# Patient Record
Sex: FEMALE | URBAN - METROPOLITAN AREA
[De-identification: names, ages, dates, MRNs, and addresses within clinical notes are randomized per-mention and may not be internally consistent; named-entity substitution may affect disease eponyms.]

---

## 2018-01-17 ENCOUNTER — EMERGENCY (EMERGENCY)
Facility: HOSPITAL | Age: 63
LOS: 0 days | Discharge: HOME | End: 2018-01-17
Attending: EMERGENCY MEDICINE | Admitting: EMERGENCY MEDICINE
Payer: COMMERCIAL

## 2018-01-17 VITALS
DIASTOLIC BLOOD PRESSURE: 63 MMHG | TEMPERATURE: 99 F | OXYGEN SATURATION: 100 % | RESPIRATION RATE: 18 BRPM | WEIGHT: 117.95 LBS | SYSTOLIC BLOOD PRESSURE: 162 MMHG | HEIGHT: 64 IN | HEART RATE: 77 BPM

## 2018-01-17 DIAGNOSIS — M79.602 PAIN IN LEFT ARM: ICD-10-CM
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
## 2018-01-17 DIAGNOSIS — S50.02XA CONTUSION OF LEFT ELBOW, INITIAL ENCOUNTER: ICD-10-CM

## 2018-01-17 DIAGNOSIS — S20.212A CONTUSION OF LEFT FRONT WALL OF THORAX, INITIAL ENCOUNTER: ICD-10-CM

## 2018-01-17 DIAGNOSIS — S63.502A UNSPECIFIED SPRAIN OF LEFT WRIST, INITIAL ENCOUNTER: ICD-10-CM
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
## 2018-01-17 DIAGNOSIS — T14.8XXA OTHER INJURY OF UNSPECIFIED BODY REGION, INITIAL ENCOUNTER: ICD-10-CM

## 2018-01-17 PROCEDURE — 71100 X-RAY EXAM RIBS UNI 2 VIEWS: CPT | Mod: 26,LT

## 2018-01-17 PROCEDURE — 99285 EMERGENCY DEPT VISIT HI MDM: CPT

## 2018-01-17 PROCEDURE — 73090 X-RAY EXAM OF FOREARM: CPT | Mod: 26,LT

## 2018-01-17 PROCEDURE — 71046 X-RAY EXAM CHEST 2 VIEWS: CPT | Mod: 26

## 2018-01-17 PROCEDURE — 73110 X-RAY EXAM OF WRIST: CPT | Mod: 26,LT

## 2018-01-17 PROCEDURE — 73080 X-RAY EXAM OF ELBOW: CPT | Mod: 26,LT

## 2018-01-17 RX ORDER — TETANUS TOXOID, REDUCED DIPHTHERIA TOXOID AND ACELLULAR PERTUSSIS VACCINE, ADSORBED 5; 2.5; 8; 8; 2.5 [IU]/.5ML; [IU]/.5ML; UG/.5ML; UG/.5ML; UG/.5ML
0.5 SUSPENSION INTRAMUSCULAR ONCE
Qty: 0 | Refills: 0 | Status: COMPLETED | OUTPATIENT
Start: 2018-01-17 | End: 2018-01-17

## 2018-01-17 RX ADMIN — TETANUS TOXOID, REDUCED DIPHTHERIA TOXOID AND ACELLULAR PERTUSSIS VACCINE, ADSORBED 0.5 MILLILITER(S): 5; 2.5; 8; 8; 2.5 SUSPENSION INTRAMUSCULAR at 20:22

## 2018-01-17 NOTE — ED STATDOCS - NS_ ATTENDINGSCRIBEDETAILS _ED_A_ED_FT
Gentry Morton DO (Attending): The history, relevant review of systems, past medical and surgical history, medical decision making, and physical examination was documented by the scribe in my presence and I attest to the accuracy of the documentation.

## 2018-01-17 NOTE — ED STATDOCS - PROGRESS NOTE DETAILS
62 yr. old female PMH: Denied presents to ED with abrasions and ecchymosis on left arm and pain in left rib cage after falling on Ice going down stairs this morning. Hi head but no LOC no N/V. Not taking any blood thinners. No hip or pelvis pain.  Ambulatory without difficulty. No daily meds. Seen and examined by attending in intake. Plan: X-Ray left elbow wrist and left ribs. Will F/U with results and re evaluate. Jovita SAUCEDO Abrasions left arm: cleansed and irrigated with saline bacitracin and DSD applied. Jovita NP

## 2018-01-17 NOTE — ED STATDOCS - CARE PLAN
Principal Discharge DX:	Abrasions of multiple sites  Secondary Diagnosis:	Elbow contusion  Secondary Diagnosis:	Wrist sprain

## 2018-01-17 NOTE — ED STATDOCS - MUSCULOSKELETAL, MLM
+Left forearm with 4cm x 2cm hematoma overlying volar ulnar aspect with deep abrasion, deep abrasion also just proximal to elbow. +Bony tenderness over mid ulna. Strength 5/5, sensation and pulses intact. +Mild midaxillary tenderness ribs 7, 8, 9, no overlying ecchymosis, skin normal. No pain with deep inspiration. range of motion is not limited and there is no muscle tenderness.

## 2018-01-17 NOTE — ED STATDOCS - ATTENDING CONTRIBUTION TO CARE
I, Gentry Morton DO,  performed the initial face to face bedside interview with this patient regarding history of present illness, review of symptoms and relevant past medical, social and family history.  I completed an independent physical examination.  I was the initial provider who evaluated this patient. I have signed out the follow up of any pending tests (i.e. labs, radiological studies) to the ACP.  I have communicated the patient’s plan of care and disposition with the ACP.

## 2018-01-17 NOTE — ED STATDOCS - OBJECTIVE STATEMENT
63 y/o female with no significant PMHx presents to the ED c/o left arm abrasions, bruising, and pain s/p fall this morning on ice. Pt states she hit her head but denies any injury or pain to the head. Pain rated 1/10. +Left outer rib tenderness. +Mild nausea shortly after fall, quickly resolved. Denies difficulty ambulating, denies pelvic/hip pain. Pt is right-handed. Last tetanus unknown. No daily meds. No PMD.

## 2020-11-16 ENCOUNTER — TRANSCRIPTION ENCOUNTER (OUTPATIENT)
Age: 65
End: 2020-11-16

## 2021-08-12 ENCOUNTER — APPOINTMENT (OUTPATIENT)
Dept: OBGYN | Facility: CLINIC | Age: 66
End: 2021-08-12

## 2023-01-11 ENCOUNTER — OFFICE (OUTPATIENT)
Dept: URBAN - METROPOLITAN AREA CLINIC 102 | Facility: CLINIC | Age: 68
Setting detail: OPHTHALMOLOGY
End: 2023-01-11
Payer: COMMERCIAL

## 2023-01-11 DIAGNOSIS — H16.223: ICD-10-CM

## 2023-01-11 DIAGNOSIS — H02.402: ICD-10-CM

## 2023-01-11 DIAGNOSIS — H25.13: ICD-10-CM

## 2023-01-11 PROCEDURE — 92004 COMPRE OPH EXAM NEW PT 1/>: CPT | Performed by: OPHTHALMOLOGY

## 2023-01-11 ASSESSMENT — DRY EYES - PHYSICIAN NOTES
OS_GENERALCOMMENTS: KERATITIS
OD_GENERALCOMMENTS: KERATITIS

## 2023-01-11 ASSESSMENT — REFRACTION_AUTOREFRACTION
OS_CYLINDER: -0.75
OD_SPHERE: +2.50
OS_AXIS: 164
OS_SPHERE: +1.75
OD_AXIS: 123
OD_CYLINDER: -0.75

## 2023-01-11 ASSESSMENT — CONFRONTATIONAL VISUAL FIELD TEST (CVF)
OS_FINDINGS: FULL
OD_FINDINGS: FULL

## 2023-01-11 ASSESSMENT — SUPERFICIAL PUNCTATE KERATITIS (SPK)
OS_SPK: 1+
OD_SPK: 1+

## 2023-01-11 ASSESSMENT — KERATOMETRY
OD_K2POWER_DIOPTERS: 43.00
OS_K2POWER_DIOPTERS: 44.00
OD_AXISANGLE_DEGREES: 090
OS_K1POWER_DIOPTERS: 43.25
OD_K1POWER_DIOPTERS: 43.00
OS_AXISANGLE_DEGREES: 076

## 2023-01-11 ASSESSMENT — AXIALLENGTH_DERIVED
OD_AL: 22.9648
OS_AL: 23.0243

## 2023-01-11 ASSESSMENT — SPHEQUIV_DERIVED
OS_SPHEQUIV: 1.375
OD_SPHEQUIV: 2.125

## 2023-01-11 ASSESSMENT — VISUAL ACUITY
OD_BCVA: 20/25-
OS_BCVA: 20/30+2

## 2023-01-11 ASSESSMENT — LID POSITION - PTOSIS: OS_PTOSIS: 1+

## 2023-01-11 ASSESSMENT — TONOMETRY
OD_IOP_MMHG: 10
OS_IOP_MMHG: 12

## 2023-01-16 ENCOUNTER — OFFICE (OUTPATIENT)
Dept: URBAN - METROPOLITAN AREA CLINIC 102 | Facility: CLINIC | Age: 68
Setting detail: OPHTHALMOLOGY
End: 2023-01-16
Payer: COMMERCIAL

## 2023-01-16 DIAGNOSIS — H02.402: ICD-10-CM

## 2023-01-16 DIAGNOSIS — H25.13: ICD-10-CM

## 2023-01-16 DIAGNOSIS — H16.223: ICD-10-CM

## 2023-01-16 PROCEDURE — 92012 INTRM OPH EXAM EST PATIENT: CPT | Performed by: OPHTHALMOLOGY

## 2023-01-16 ASSESSMENT — KERATOMETRY
OS_AXISANGLE_DEGREES: 065
OD_AXISANGLE_DEGREES: 076
OS_K2POWER_DIOPTERS: 44.25
OD_K1POWER_DIOPTERS: 43.00
OD_K2POWER_DIOPTERS: 43.50
OS_K1POWER_DIOPTERS: 43.25

## 2023-01-16 ASSESSMENT — REFRACTION_AUTOREFRACTION
OD_CYLINDER: -0.50
OS_AXIS: 147
OS_CYLINDER: -0.75
OD_AXIS: 128
OS_SPHERE: +1.75
OD_SPHERE: +2.25

## 2023-01-16 ASSESSMENT — SPHEQUIV_DERIVED
OS_SPHEQUIV: 1.375
OD_SPHEQUIV: 2

## 2023-01-16 ASSESSMENT — VISUAL ACUITY
OD_BCVA: 20/25-
OS_BCVA: 20/30

## 2023-01-16 ASSESSMENT — TONOMETRY
OS_IOP_MMHG: 15
OD_IOP_MMHG: 11

## 2023-01-16 ASSESSMENT — AXIALLENGTH_DERIVED
OS_AL: 22.9806
OD_AL: 22.924

## 2023-01-16 ASSESSMENT — SUPERFICIAL PUNCTATE KERATITIS (SPK)
OS_SPK: ABSENT
OD_SPK: ABSENT

## 2023-01-16 ASSESSMENT — CONFRONTATIONAL VISUAL FIELD TEST (CVF)
OD_FINDINGS: FULL
OS_FINDINGS: FULL

## 2023-01-16 ASSESSMENT — DRY EYES - PHYSICIAN NOTES
OS_GENERALCOMMENTS: KERATITIS
OD_GENERALCOMMENTS: KERATITIS

## 2023-01-16 ASSESSMENT — LID POSITION - PTOSIS: OS_PTOSIS: 1+

## 2023-08-06 DIAGNOSIS — Z78.9 OTHER SPECIFIED HEALTH STATUS: ICD-10-CM

## 2023-08-06 DIAGNOSIS — Z83.3 FAMILY HISTORY OF DIABETES MELLITUS: ICD-10-CM

## 2023-08-07 ENCOUNTER — APPOINTMENT (OUTPATIENT)
Dept: FAMILY MEDICINE | Facility: CLINIC | Age: 68
End: 2023-08-07
Payer: COMMERCIAL

## 2023-08-07 VITALS
SYSTOLIC BLOOD PRESSURE: 120 MMHG | BODY MASS INDEX: 20.83 KG/M2 | OXYGEN SATURATION: 98 % | HEART RATE: 62 BPM | HEIGHT: 64 IN | TEMPERATURE: 98.4 F | DIASTOLIC BLOOD PRESSURE: 62 MMHG | WEIGHT: 122 LBS

## 2023-08-07 DIAGNOSIS — Z83.49 FAMILY HISTORY OF OTHER ENDOCRINE, NUTRITIONAL AND METABOLIC DISEASES: ICD-10-CM

## 2023-08-07 DIAGNOSIS — Z83.42 FAMILY HISTORY OF FAMILIAL HYPERCHOLESTEROLEMIA: ICD-10-CM

## 2023-08-07 DIAGNOSIS — Z86.2 PERSONAL HISTORY OF DISEASES OF THE BLOOD AND BLOOD-FORMING ORGANS AND CERTAIN DISORDERS INVOLVING THE IMMUNE MECHANISM: ICD-10-CM

## 2023-08-07 DIAGNOSIS — R00.1 BRADYCARDIA, UNSPECIFIED: ICD-10-CM

## 2023-08-07 DIAGNOSIS — Z91.09 OTHER ALLERGY STATUS, OTHER THAN TO DRUGS AND BIOLOGICAL SUBSTANCES: ICD-10-CM

## 2023-08-07 DIAGNOSIS — Z00.00 ENCOUNTER FOR GENERAL ADULT MEDICAL EXAMINATION W/OUT ABNORMAL FINDINGS: ICD-10-CM

## 2023-08-07 DIAGNOSIS — B00.2 HERPESVIRAL GINGIVOSTOMATITIS AND PHARYNGOTONSILLITIS: ICD-10-CM

## 2023-08-07 DIAGNOSIS — Z12.39 ENCOUNTER FOR OTHER SCREENING FOR MALIGNANT NEOPLASM OF BREAST: ICD-10-CM

## 2023-08-07 PROCEDURE — 99387 INIT PM E/M NEW PAT 65+ YRS: CPT | Mod: 25

## 2023-08-07 PROCEDURE — 93000 ELECTROCARDIOGRAM COMPLETE: CPT | Mod: 59

## 2023-08-07 PROCEDURE — 36415 COLL VENOUS BLD VENIPUNCTURE: CPT

## 2023-08-07 RX ORDER — VALACYCLOVIR 1 G/1
1 TABLET, FILM COATED ORAL
Qty: 10 | Refills: 0 | Status: ACTIVE | COMMUNITY
Start: 2023-07-21

## 2023-08-07 NOTE — ASSESSMENT
[FreeTextEntry1] : BRENDA OLIVO is a 68 year old female here for a physical exam.  She is also here to follow up on medical issues as noted above.

## 2023-08-07 NOTE — PHYSICAL EXAM
[No Acute Distress] : no acute distress [Well Developed] : well developed [Well-Appearing] : well-appearing [Normal Sclera/Conjunctiva] : normal sclera/conjunctiva [EOMI] : extraocular movements intact [Normal Outer Ear/Nose] : the outer ears and nose were normal in appearance [Normal Oropharynx] : the oropharynx was normal [No JVD] : no jugular venous distention [No Lymphadenopathy] : no lymphadenopathy [Supple] : supple [Thyroid Normal, No Nodules] : the thyroid was normal and there were no nodules present [No Respiratory Distress] : no respiratory distress  [No Accessory Muscle Use] : no accessory muscle use [Clear to Auscultation] : lungs were clear to auscultation bilaterally [Normal Rate] : normal rate  [Regular Rhythm] : with a regular rhythm [Normal S1, S2] : normal S1 and S2 [No Murmur] : no murmur heard [No Carotid Bruits] : no carotid bruits [No Varicosities] : no varicosities [Pedal Pulses Present] : the pedal pulses are present [No Edema] : there was no peripheral edema [No Extremity Clubbing/Cyanosis] : no extremity clubbing/cyanosis [Soft] : abdomen soft [Non Tender] : non-tender [Non-distended] : non-distended [No Masses] : no abdominal mass palpated [No HSM] : no HSM [Normal Bowel Sounds] : normal bowel sounds [Normal Posterior Cervical Nodes] : no posterior cervical lymphadenopathy [Normal Anterior Cervical Nodes] : no anterior cervical lymphadenopathy [No Joint Swelling] : no joint swelling [Grossly Normal Strength/Tone] : grossly normal strength/tone [No Rash] : no rash [Coordination Grossly Intact] : coordination grossly intact [No Focal Deficits] : no focal deficits [Normal Gait] : normal gait [Normal Affect] : the affect was normal [Normal Insight/Judgement] : insight and judgment were intact [de-identified] : slender frame

## 2023-08-07 NOTE — PLAN
[FreeTextEntry1] : Continue all medications as prescribed. Check labs as above (fasting except for coffee with a little cream) . Will adjust any medications based upon lab results.  Reviewed age-appropriate preventive screening tests with patient. UTD on CRC screening and she will get a copy of colonoscopy results sent to me for review. Due for gyn exam and mammogram and DEXA and she will schedule for near future. [facility] names given and she believes that Dr. Chung delivered her daughter years ago.   Revd/recommended Tdap booster, PCV 20, Shingrix series. She recalls she had SHingrix series and may have had Tdap and she will try to get dates for me. She plans PCV20 in near future at pharmacy (defers today)   ECG shows sinus bradycardia (she is physically active/in good shape but will check TSH) and RSR1 V1 nl variant.   Discussed clean eating (eg Mediterranean style eating plan) and regular exercise/staying as physically active as possible.  Include balance exercises and strength training and core strengthening exercises for bone health and to decrease risk for falls.  Reviewed diagnosis, etiology, preventative care measures and treatment for oral HSV. Reviewed importance of starting oral anti-viral medication ASAP at onset of first symptoms (eg tingling, burning, itching sensation, ideally prior to onset of vesicle/sore) for best effect. Can also use L lysine po supplement, and/or tea tree oil topically. Supporting healthy immune system (eating cleanly, trying to get enough rest, regular exercise, stress reduction/management as much as possible) and using UV protection on face/lips can also help to reduce frequency/severity of, or prevent outbreaks.  Reviewed importance of good self care (e.g. meditation, yoga, adequate rest, regular exercise, magnesium, clean eating, etc.).  Follow up for next physical in one year.

## 2023-08-07 NOTE — REVIEW OF SYSTEMS
[Negative] : Heme/Lymph [FreeTextEntry4] : h/o oral herpes, recently given Rx fort Valacyclovir but has not yet needed to use

## 2023-08-07 NOTE — HISTORY OF PRESENT ILLNESS
[FreeTextEntry1] : BRENDA OLIVO is a 68 year old female here for a physical exam. [de-identified] : Yodit is here to re-establish primary care (last visit to our office was in 2009) and for a PE visit. Her last physical exam was years ago (?2009)   Vaccines: Tetanus is up to date in past 10 yrs?, she is not sure but may have had after injury at [facility] and she will try to check and let me know Pneumococcal vaccination is NOT up to date Shingrix is up to date, Shingrix x 2 doses, first dose a while back and second dose in 2023-- she will try to get dates for me COVID vaccine is up to date  Her last dentist visit was less than one year ago Her last eye doctor appointment was less than one year ago Her last dermatologist visit was less than one year ago, Dr. Art's NP   GYN visit is NOT up to date, Dr. Chaparro/Dr. Braun several years ago  Mammogram is NOT up to date, several yrs ago Colon cancer screening is up to date, colonoscopy, in GI office [facility] (?name of GI), ?early 2020, wnl per pt, recalls was told rpt by 10 yrs -- she will try to get a copy of results sent to me DEXA is NOT up to date, never had   Her diet is healthy overall, more of a [id number] style eating plan, occas red meat but more chicken fish  Exercise: works with  2x/wk TRX, Pilates, walking/active in daily life   Yodit has h/o envtal allergies (car dander, seasonal) and h/o anemia (years ago).  Her  sees Dr. Carley Luna at [facility] for card care. She has not had any cardiac sxs or need to see card. Has strong FH high chol in mom but mom is 97 yrs of age and has not had signif CV disease

## 2023-08-07 NOTE — HEALTH RISK ASSESSMENT
[No falls in past year] : Patient reported no falls in the past year [0] : 2) Feeling down, depressed, or hopeless: Not at all (0) [PHQ-2 Negative - No further assessment needed] : PHQ-2 Negative - No further assessment needed [Never] : Never [[id number]] : 0

## 2023-08-08 DIAGNOSIS — E78.5 HYPERLIPIDEMIA, UNSPECIFIED: ICD-10-CM

## 2023-08-08 LAB
ALBUMIN SERPL ELPH-MCNC: 4.5 G/DL
ALP BLD-CCNC: 59 U/L
ALT SERPL-CCNC: 14 U/L
ANION GAP SERPL CALC-SCNC: 11 MMOL/L
AST SERPL-CCNC: 19 U/L
BASOPHILS # BLD AUTO: 0.02 K/UL
BASOPHILS NFR BLD AUTO: 0.4 %
BILIRUB SERPL-MCNC: 1.1 MG/DL
BUN SERPL-MCNC: 9 MG/DL
CALCIUM SERPL-MCNC: 9.6 MG/DL
CHLORIDE SERPL-SCNC: 106 MMOL/L
CHOLEST SERPL-MCNC: 261 MG/DL
CO2 SERPL-SCNC: 26 MMOL/L
CREAT SERPL-MCNC: 0.78 MG/DL
EGFR: 82 ML/MIN/1.73M2
EOSINOPHIL # BLD AUTO: 0.09 K/UL
EOSINOPHIL NFR BLD AUTO: 1.7 %
GLUCOSE SERPL-MCNC: 99 MG/DL
HCT VFR BLD CALC: 42.4 %
HDLC SERPL-MCNC: 88 MG/DL
HGB BLD-MCNC: 13.3 G/DL
IMM GRANULOCYTES NFR BLD AUTO: 0.2 %
LDLC SERPL CALC-MCNC: 160 MG/DL
LYMPHOCYTES # BLD AUTO: 1.49 K/UL
LYMPHOCYTES NFR BLD AUTO: 28.2 %
MAN DIFF?: NORMAL
MCHC RBC-ENTMCNC: 30.8 PG
MCHC RBC-ENTMCNC: 31.4 GM/DL
MCV RBC AUTO: 98.1 FL
MONOCYTES # BLD AUTO: 0.35 K/UL
MONOCYTES NFR BLD AUTO: 6.6 %
NEUTROPHILS # BLD AUTO: 3.32 K/UL
NEUTROPHILS NFR BLD AUTO: 62.9 %
NONHDLC SERPL-MCNC: 173 MG/DL
PLATELET # BLD AUTO: 218 K/UL
POTASSIUM SERPL-SCNC: 4.3 MMOL/L
PROT SERPL-MCNC: 6.7 G/DL
RBC # BLD: 4.32 M/UL
RBC # FLD: 13.2 %
SODIUM SERPL-SCNC: 143 MMOL/L
TRIGL SERPL-MCNC: 80 MG/DL
TSH SERPL-ACNC: 1.17 UIU/ML
WBC # FLD AUTO: 5.28 K/UL

## 2024-03-04 ENCOUNTER — APPOINTMENT (OUTPATIENT)
Dept: OBGYN | Facility: CLINIC | Age: 69
End: 2024-03-04
Payer: COMMERCIAL

## 2024-03-04 VITALS
BODY MASS INDEX: 20.32 KG/M2 | SYSTOLIC BLOOD PRESSURE: 136 MMHG | WEIGHT: 119 LBS | DIASTOLIC BLOOD PRESSURE: 74 MMHG | HEIGHT: 64 IN

## 2024-03-04 DIAGNOSIS — N95.2 POSTMENOPAUSAL ATROPHIC VAGINITIS: ICD-10-CM

## 2024-03-04 DIAGNOSIS — Z01.419 ENCOUNTER FOR GYNECOLOGICAL EXAMINATION (GENERAL) (ROUTINE) W/OUT ABNORMAL FINDINGS: ICD-10-CM

## 2024-03-04 LAB
CARD LOT #: NORMAL
CARD LOT EXP DATE: NORMAL
DATE COLLECTED: NORMAL
DATE COLLECTED: NORMAL
DEVELOPER LOT #: NORMAL
DEVELOPER LOT EXP DATE: NORMAL
HEMOCCULT 2: NEGATIVE
HEMOCCULT SP1 STL QL: NEGATIVE
QUALITY CONTROL: YES
QUALITY CONTROL: YES

## 2024-03-04 PROCEDURE — 99387 INIT PM E/M NEW PAT 65+ YRS: CPT

## 2024-03-04 PROCEDURE — 82270 OCCULT BLOOD FECES: CPT

## 2024-03-04 NOTE — PHYSICAL EXAM
[Appropriately responsive] : appropriately responsive [Alert] : alert [No Acute Distress] : no acute distress [No Lymphadenopathy] : no lymphadenopathy [Regular Rate Rhythm] : regular rate rhythm [No Murmurs] : no murmurs [Soft] : soft [Clear to Auscultation B/L] : clear to auscultation bilaterally [Non-tender] : non-tender [Non-distended] : non-distended [No HSM] : No HSM [No Lesions] : no lesions [No Mass] : no mass [Oriented x3] : oriented x3 [Vulvar Atrophy] : vulvar atrophy [Labia Majora] : normal [Labia Minora] : normal [Atrophy] : atrophy [Normal] : normal [Uterine Adnexae] : normal [No Tenderness] : no tenderness [Nl Sphincter Tone] : normal sphincter tone [FreeTextEntry5] : Pap done [FreeTextEntry9] : Hemoccult negative

## 2024-03-04 NOTE — HISTORY OF PRESENT ILLNESS
[FreeTextEntry1] : Patient is a 68-year-old female presents for a routine annual gynecologic examination.  Patients last exam was over 10 years ago.  Patient has never had a bone density DEXA and last mammogram was many years ago.  Patient has no complaints.

## 2024-03-04 NOTE — DISCUSSION/SUMMARY
[FreeTextEntry1] : Impression: Vulvovaginal atrophy, otherwise normal GYN exam  Recommendations: Self breast exam, mammography, bone density DEXA, calcium and vitamin D supplementation regular exercise  Follow-up in 1 year

## 2024-03-06 LAB — CYTOLOGY CVX/VAG DOC THIN PREP: ABNORMAL

## 2024-04-25 ENCOUNTER — APPOINTMENT (OUTPATIENT)
Dept: ORTHOPEDIC SURGERY | Facility: CLINIC | Age: 69
End: 2024-04-25

## 2024-08-12 ENCOUNTER — APPOINTMENT (OUTPATIENT)
Dept: FAMILY MEDICINE | Facility: CLINIC | Age: 69
End: 2024-08-12
Payer: COMMERCIAL

## 2024-08-12 VITALS
HEART RATE: 63 BPM | DIASTOLIC BLOOD PRESSURE: 68 MMHG | WEIGHT: 124 LBS | HEIGHT: 64 IN | BODY MASS INDEX: 21.17 KG/M2 | SYSTOLIC BLOOD PRESSURE: 110 MMHG | OXYGEN SATURATION: 97 % | TEMPERATURE: 97 F

## 2024-08-12 DIAGNOSIS — Z23 ENCOUNTER FOR IMMUNIZATION: ICD-10-CM

## 2024-08-12 DIAGNOSIS — B00.2 HERPESVIRAL GINGIVOSTOMATITIS AND PHARYNGOTONSILLITIS: ICD-10-CM

## 2024-08-12 DIAGNOSIS — R00.1 BRADYCARDIA, UNSPECIFIED: ICD-10-CM

## 2024-08-12 DIAGNOSIS — Z91.09 OTHER ALLERGY STATUS, OTHER THAN TO DRUGS AND BIOLOGICAL SUBSTANCES: ICD-10-CM

## 2024-08-12 DIAGNOSIS — Z00.00 ENCOUNTER FOR GENERAL ADULT MEDICAL EXAMINATION W/OUT ABNORMAL FINDINGS: ICD-10-CM

## 2024-08-12 DIAGNOSIS — E78.5 HYPERLIPIDEMIA, UNSPECIFIED: ICD-10-CM

## 2024-08-12 DIAGNOSIS — Z82.3 FAMILY HISTORY OF STROKE: ICD-10-CM

## 2024-08-12 DIAGNOSIS — Z86.2 PERSONAL HISTORY OF DISEASES OF THE BLOOD AND BLOOD-FORMING ORGANS AND CERTAIN DISORDERS INVOLVING THE IMMUNE MECHANISM: ICD-10-CM

## 2024-08-12 PROCEDURE — 99397 PER PM REEVAL EST PAT 65+ YR: CPT

## 2024-08-12 NOTE — HISTORY OF PRESENT ILLNESS
[FreeTextEntry1] : BRENDA OLIVO is a 69 year old female here for a physical exam. [de-identified] : Her last physical exam was last year  Vaccines: Tetanus is up to date? She may have had dose in hosp within past 10 yrs and will check Pneumococcal vaccination is NOT up to date Shingrix is up to date x 2 doses and she is going to get dates for me Gets annual flu vacc at work   Her last dentist visit was within past 6-12 months Her last eye doctor appointment was less than one year ago Her last dermatologist visit was less than one year ago, Dr. Art's NP eRnae Schaeffer. 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 Mohs surgeon is based in [facility]    GYN visit is up to date, 3/2024, Dr. Chung Mammogram is NOT up to date, last mammo was years ago Colon cancer screening is up to date she states, as had colonoscopy, in GI office [place] (?name of GI), ?early 2020, wnl per pt, recalls was told rpt by 10 yrs -- she will try to get a copy of results sent to me Had hemoccult x 2 negative 3/2024 with gyn.  DEXA is NOT up to date, as has never had DEXA exam.   Her diet is healthy overall, Mediterranean style eating, no processed food.  Exercise: cardio and strength training   Yodit has high cholesterol, environmental allergies (cat dander, seasonal) and h/o anemia (years ago).   At her 2023 PE visit , HDL 88. 10 yr ASCVD 7.4%. She has been working on eating cleanly  and exercising.   Her  sees Dr. Carley Luna at [facility] for card care. She has not had any cardiac sxs or need to see card. 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 She will get results sent over

## 2024-08-12 NOTE — HEALTH RISK ASSESSMENT
[No falls in past year] : Patient reported no falls in the past year [0] : 2) Feeling down, depressed, or hopeless: Not at all (0) [PHQ-2 Negative - No further assessment needed] : PHQ-2 Negative - No further assessment needed [Never] : Never [[id number]] : 0

## 2024-08-12 NOTE — PHYSICAL EXAM
[No Acute Distress] : no acute distress [Well Developed] : well developed [Well-Appearing] : well-appearing [Normal Sclera/Conjunctiva] : normal sclera/conjunctiva [EOMI] : extraocular movements intact [Normal Outer Ear/Nose] : the outer ears and nose were normal in appearance [Normal Oropharynx] : the oropharynx was normal [No JVD] : no jugular venous distention [No Lymphadenopathy] : no lymphadenopathy [Supple] : supple [Thyroid Normal, No Nodules] : the thyroid was normal and there were no nodules present [No Respiratory Distress] : no respiratory distress  [No Accessory Muscle Use] : no accessory muscle use [Clear to Auscultation] : lungs were clear to auscultation bilaterally [Normal Rate] : normal rate  [Regular Rhythm] : with a regular rhythm [Normal S1, S2] : normal S1 and S2 [No Murmur] : no murmur heard [No Carotid Bruits] : no carotid bruits [No Varicosities] : no varicosities [Pedal Pulses Present] : the pedal pulses are present [No Edema] : there was no peripheral edema [No Extremity Clubbing/Cyanosis] : no extremity clubbing/cyanosis [Soft] : abdomen soft [Non Tender] : non-tender [Non-distended] : non-distended [No Masses] : no abdominal mass palpated [No HSM] : no HSM [Normal Bowel Sounds] : normal bowel sounds [Normal Posterior Cervical Nodes] : no posterior cervical lymphadenopathy [Normal Anterior Cervical Nodes] : no anterior cervical lymphadenopathy [No Joint Swelling] : no joint swelling [Grossly Normal Strength/Tone] : grossly normal strength/tone [No Rash] : no rash [Coordination Grossly Intact] : coordination grossly intact [No Focal Deficits] : no focal deficits [Normal Gait] : normal gait [Normal Affect] : the affect was normal [Normal Insight/Judgement] : insight and judgment were intact [de-identified] : slender frame

## 2024-08-12 NOTE — REVIEW OF SYSTEMS
[Negative] : Heme/Lymph [Earache] : no earache [Nasal Discharge] : no nasal discharge [Sore Throat] : no sore throat [Postnasal Drip] : no postnasal drip [FreeTextEntry4] : h/o oral herpes, uses Valacyclovir prn, also seasonal/envtal allergies intermittently

## 2024-08-12 NOTE — PHYSICAL EXAM
[No Acute Distress] : no acute distress [Well Developed] : well developed [Well-Appearing] : well-appearing [Normal Sclera/Conjunctiva] : normal sclera/conjunctiva [EOMI] : extraocular movements intact [Normal Outer Ear/Nose] : the outer ears and nose were normal in appearance [Normal Oropharynx] : the oropharynx was normal [No JVD] : no jugular venous distention [No Lymphadenopathy] : no lymphadenopathy [Supple] : supple [Thyroid Normal, No Nodules] : the thyroid was normal and there were no nodules present [No Respiratory Distress] : no respiratory distress  [No Accessory Muscle Use] : no accessory muscle use [Clear to Auscultation] : lungs were clear to auscultation bilaterally [Normal Rate] : normal rate  [Regular Rhythm] : with a regular rhythm [Normal S1, S2] : normal S1 and S2 [No Murmur] : no murmur heard [No Carotid Bruits] : no carotid bruits [No Varicosities] : no varicosities [Pedal Pulses Present] : the pedal pulses are present [No Edema] : there was no peripheral edema [No Extremity Clubbing/Cyanosis] : no extremity clubbing/cyanosis [Soft] : abdomen soft [Non Tender] : non-tender [Non-distended] : non-distended [No Masses] : no abdominal mass palpated [No HSM] : no HSM [Normal Bowel Sounds] : normal bowel sounds [Normal Posterior Cervical Nodes] : no posterior cervical lymphadenopathy [Normal Anterior Cervical Nodes] : no anterior cervical lymphadenopathy [No Joint Swelling] : no joint swelling [Grossly Normal Strength/Tone] : grossly normal strength/tone [No Rash] : no rash [Coordination Grossly Intact] : coordination grossly intact [No Focal Deficits] : no focal deficits [Normal Gait] : normal gait [Normal Affect] : the affect was normal [Normal Insight/Judgement] : insight and judgment were intact [de-identified] : slender frame

## 2024-08-12 NOTE — PLAN
[FreeTextEntry1] : Continue all medications as prescribed. We deferred on labs today as she had labs in Spring 2024 as part of corporate PE and would like to send those in for review before deciding if addt labs needed.   Reviewed age-appropriate preventive screening tests with patient. UTD on gyn exam. Due for mammogram, and DEXA and will schedule for near future. Revd that there are orders in system from Dr. Chung for mammo and DEXA. She feels she is UTD on colorectal cancer screening and will try to get copy of most recent colonoscopy (or contact info for GI who did colonoscopy) for me to review.   Revd/recommended Tdap booster (if can not find proof of having had within past 10 yrs, and PCV 20 dose. She is certain she had Shingrix series x 2 doses and will gets dates for me to review. She consented to Tdap though we did not get a chance to give to her prior to her leaving office today so will call to let her know she can return anytime  She had ECg as part of her corporate PE Spring 2024 and was wnl and she will send results in. Once we review results we can decide if any addtl labs or testing is needed  LDL goal <=100 ideally. Reviewed LDL goal and ASCVD risk estimate and factors that go into this calculation.  Reviewed risks/benefits of statin med. Reviewed red yeast rice RYR (600-1200 mg daily) risks/benefits as an alternative to statin meds if not ready to consider statin meds. Recommended excellent hydration +/- co Q10 (100-400 mg daily) to decrease risk for statin (or RYR) related myalgias.   Based on most recent LDL and 10 yr ASCVD risk estimate level statin therapy is not yet clearly indicated. Consider use of psyllium husk fiber supplement. Consider nonurgent card eval and testing as that information would likely help her to make a more informed decision re if needs to consider statin med sooner than the numbers show need to.   Discussed clean eating (eg Mediterranean style plant based eating plan) and regular exercise/staying as physically active as possible.  Include balance exercises and strength training and core strengthening exercises for bone health and to decrease risk for falls.  Reviewed diagnosis, etiology, preventative care measures and treatment for oral HSV. Reviewed importance of starting oral anti-viral medication ASAP at onset of first symptoms (eg tingling, burning, itching sensation, ideally prior to onset of vesicle/sore) for best effect. Can also use L lysine po supplement, and/or tea tree oil topically. Supporting healthy immune system (eating cleanly, trying to get enough rest, regular exercise, stress reduction/management as much as possible) and using UV protection on face/lips can also help to reduce frequency/severity of, or prevent outbreaks.  Diagnosis, common environmental causes, treatment options for environmental/seasonal allergies reviewed. Reviewed environmental control measures (keep windows closed, put on AC, shower/rinse off after being outdoors, pillow/mattress covers, HEPA type filters etc),nasal steroid (eg fluticasone= Flonase, Nasonex=mometasone etc) and/or nasal antihistamine (eg Azelastine=Astepro) sprays,  OTC oral antihistamine +/- oral decongestant if tolerated, nasal saline flushes/Neti Pot, +/- can add Mucinex, +/- can add Singulair (Montelukast) to find what combination of meds works best.  Reviewed importance of good self care (e.g. meditation, yoga, adequate rest, regular exercise, magnesium, clean eating, etc.).  Follow up with specialists as recommended by them.   Follow up for next physical in one year. Schedule RPA visit (chol etc) and repeat fasting labs in about 6 months.

## 2024-08-12 NOTE — HEALTH RISK ASSESSMENT
[No falls in past year] : Patient reported no falls in the past year [0] : 2) Feeling down, depressed, or hopeless: Not at all (0) [PHQ-2 Negative - No further assessment needed] : PHQ-2 Negative - No further assessment needed [Never] : Never [[id number]] : 0

## 2024-08-12 NOTE — HISTORY OF PRESENT ILLNESS
[FreeTextEntry1] : BRENDA OLIVO is a 69 year old female here for a physical exam. [de-identified] : Her last physical exam was last year  Vaccines: Tetanus is up to date? She may have had dose in hosp within past 10 yrs and will check Pneumococcal vaccination is NOT up to date Shingrix is up to date x 2 doses and she is going to get dates for me Gets annual flu vacc at work   Her last dentist visit was within past 6-12 months Her last eye doctor appointment was less than one year ago Her last dermatologist visit was less than one year ago, Dr. Art's NP Renae Scaheffer. 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 Mohs surgeon is based in [facility]    GYN visit is up to date, 3/2024, Dr. Chung Mammogram is NOT up to date, last mammo was years ago Colon cancer screening is up to date she states, as had colonoscopy, in GI office [place] (?name of GI), ?early 2020, wnl per pt, recalls was told rpt by 10 yrs -- she will try to get a copy of results sent to me Had hemoccult x 2 negative 3/2024 with gyn.  DEXA is NOT up to date, as has never had DEXA exam.   Her diet is healthy overall, Mediterranean style eating, no processed food.  Exercise: cardio and strength training   Yodit has high cholesterol, environmental allergies (cat dander, seasonal) and h/o anemia (years ago).   At her 2023 PE visit , HDL 88. 10 yr ASCVD 7.4%. She has been working on eating cleanly  and exercising.   Her  sees Dr. Carley Luna at [facility] for card care. She has not had any cardiac sxs or need to see card. 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 She will get results sent over

## 2024-08-12 NOTE — ASSESSMENT
[FreeTextEntry1] : YODIT OLIVO is a 69 year old female here for a physical exam.  She is also here to follow up on medical issues as noted above.  Yodit has high cholesterol, and environmental allergies (cat dander, seasonal)

## 2024-08-13 ENCOUNTER — APPOINTMENT (OUTPATIENT)
Dept: FAMILY MEDICINE | Facility: CLINIC | Age: 69
End: 2024-08-13
Payer: COMMERCIAL

## 2024-08-13 ENCOUNTER — MED ADMIN CHARGE (OUTPATIENT)
Age: 69
End: 2024-08-13

## 2024-08-13 PROCEDURE — 90715 TDAP VACCINE 7 YRS/> IM: CPT

## 2024-08-13 PROCEDURE — 90471 IMMUNIZATION ADMIN: CPT

## 2025-05-14 DIAGNOSIS — Z12.39 ENCOUNTER FOR OTHER SCREENING FOR MALIGNANT NEOPLASM OF BREAST: ICD-10-CM

## 2025-07-01 ENCOUNTER — RESULT REVIEW (OUTPATIENT)
Age: 70
End: 2025-07-01

## 2025-07-01 ENCOUNTER — APPOINTMENT (OUTPATIENT)
Dept: MAMMOGRAPHY | Facility: CLINIC | Age: 70
End: 2025-07-01
Payer: COMMERCIAL

## 2025-07-01 ENCOUNTER — APPOINTMENT (OUTPATIENT)
Dept: RADIOLOGY | Facility: CLINIC | Age: 70
End: 2025-07-01
Payer: COMMERCIAL

## 2025-07-01 ENCOUNTER — OUTPATIENT (OUTPATIENT)
Dept: OUTPATIENT SERVICES | Facility: HOSPITAL | Age: 70
LOS: 1 days | End: 2025-07-01
Payer: COMMERCIAL

## 2025-07-01 DIAGNOSIS — Z12.39 ENCOUNTER FOR OTHER SCREENING FOR MALIGNANT NEOPLASM OF BREAST: ICD-10-CM

## 2025-07-01 PROBLEM — Z13.820 OSTEOPOROSIS SCREENING: Status: ACTIVE | Noted: 2025-07-01

## 2025-07-01 PROCEDURE — 77067 SCR MAMMO BI INCL CAD: CPT | Mod: 26

## 2025-07-01 PROCEDURE — 77085 DXA BONE DENSITY AXL VRT FX: CPT

## 2025-07-01 PROCEDURE — 77063 BREAST TOMOSYNTHESIS BI: CPT

## 2025-07-01 PROCEDURE — 77085 DXA BONE DENSITY AXL VRT FX: CPT | Mod: 26

## 2025-07-01 PROCEDURE — 77063 BREAST TOMOSYNTHESIS BI: CPT | Mod: 26

## 2025-07-01 PROCEDURE — 77067 SCR MAMMO BI INCL CAD: CPT

## 2025-07-02 PROBLEM — R92.8 ABNORMAL FINDING ON BREAST IMAGING: Status: ACTIVE | Noted: 2025-07-02

## 2025-07-07 ENCOUNTER — APPOINTMENT (OUTPATIENT)
Dept: ULTRASOUND IMAGING | Facility: CLINIC | Age: 70
End: 2025-07-07
Payer: COMMERCIAL

## 2025-07-07 ENCOUNTER — OUTPATIENT (OUTPATIENT)
Dept: OUTPATIENT SERVICES | Facility: HOSPITAL | Age: 70
LOS: 1 days | End: 2025-07-07
Payer: COMMERCIAL

## 2025-07-07 ENCOUNTER — RESULT REVIEW (OUTPATIENT)
Age: 70
End: 2025-07-07

## 2025-07-07 DIAGNOSIS — R92.8 OTHER ABNORMAL AND INCONCLUSIVE FINDINGS ON DIAGNOSTIC IMAGING OF BREAST: ICD-10-CM

## 2025-07-07 DIAGNOSIS — Z00.8 ENCOUNTER FOR OTHER GENERAL EXAMINATION: ICD-10-CM

## 2025-07-07 PROCEDURE — 76642 ULTRASOUND BREAST LIMITED: CPT | Mod: 26,LT

## 2025-07-07 PROCEDURE — 76642 ULTRASOUND BREAST LIMITED: CPT

## 2025-07-22 ENCOUNTER — NON-APPOINTMENT (OUTPATIENT)
Age: 70
End: 2025-07-22

## 2025-07-22 ENCOUNTER — APPOINTMENT (OUTPATIENT)
Dept: ULTRASOUND IMAGING | Facility: CLINIC | Age: 70
End: 2025-07-22

## 2025-07-22 ENCOUNTER — APPOINTMENT (OUTPATIENT)
Dept: OBGYN | Facility: CLINIC | Age: 70
End: 2025-07-22
Payer: COMMERCIAL

## 2025-07-22 VITALS
DIASTOLIC BLOOD PRESSURE: 80 MMHG | HEIGHT: 64 IN | SYSTOLIC BLOOD PRESSURE: 150 MMHG | BODY MASS INDEX: 20.49 KG/M2 | WEIGHT: 120 LBS

## 2025-07-22 DIAGNOSIS — Z01.419 ENCOUNTER FOR GYNECOLOGICAL EXAMINATION (GENERAL) (ROUTINE) W/OUT ABNORMAL FINDINGS: ICD-10-CM

## 2025-07-22 DIAGNOSIS — N95.2 POSTMENOPAUSAL ATROPHIC VAGINITIS: ICD-10-CM

## 2025-07-22 DIAGNOSIS — R92.8 OTHER ABNORMAL AND INCONCLUSIVE FINDINGS ON DIAGNOSTIC IMAGING OF BREAST: ICD-10-CM

## 2025-07-22 PROCEDURE — 99397 PER PM REEVAL EST PAT 65+ YR: CPT

## 2025-07-22 PROCEDURE — 82270 OCCULT BLOOD FECES: CPT

## 2025-07-26 LAB — CYTOLOGY CVX/VAG DOC THIN PREP: ABNORMAL

## 2025-09-02 DIAGNOSIS — M81.0 AGE-RELATED OSTEOPOROSIS W/OUT CURRENT PATHOLOGICAL FRACTURE: ICD-10-CM

## 2025-09-02 DIAGNOSIS — R73.09 OTHER ABNORMAL GLUCOSE: ICD-10-CM

## 2025-09-10 ENCOUNTER — APPOINTMENT (OUTPATIENT)
Dept: FAMILY MEDICINE | Facility: CLINIC | Age: 70
End: 2025-09-10